# Patient Record
Sex: FEMALE | Race: WHITE
[De-identification: names, ages, dates, MRNs, and addresses within clinical notes are randomized per-mention and may not be internally consistent; named-entity substitution may affect disease eponyms.]

---

## 2019-04-20 ENCOUNTER — HOSPITAL ENCOUNTER (EMERGENCY)
Dept: HOSPITAL 96 - M.ERS | Age: 44
Discharge: HOME | End: 2019-04-20
Payer: COMMERCIAL

## 2019-04-20 VITALS — HEIGHT: 64 IN | BODY MASS INDEX: 19.99 KG/M2 | WEIGHT: 117.07 LBS

## 2019-04-20 VITALS — DIASTOLIC BLOOD PRESSURE: 55 MMHG | SYSTOLIC BLOOD PRESSURE: 113 MMHG

## 2019-04-20 DIAGNOSIS — Z88.0: ICD-10-CM

## 2019-04-20 DIAGNOSIS — G43.909: Primary | ICD-10-CM

## 2019-04-29 ENCOUNTER — HOSPITAL ENCOUNTER (EMERGENCY)
Dept: HOSPITAL 96 - M.ERS | Age: 44
Discharge: HOME | End: 2019-04-29
Payer: COMMERCIAL

## 2019-04-29 VITALS — DIASTOLIC BLOOD PRESSURE: 60 MMHG | SYSTOLIC BLOOD PRESSURE: 107 MMHG

## 2019-04-29 VITALS — HEIGHT: 64 IN | WEIGHT: 114.99 LBS | BODY MASS INDEX: 19.63 KG/M2

## 2019-04-29 DIAGNOSIS — Z88.0: ICD-10-CM

## 2019-04-29 DIAGNOSIS — R91.8: ICD-10-CM

## 2019-04-29 DIAGNOSIS — G43.909: ICD-10-CM

## 2019-04-29 DIAGNOSIS — J02.9: Primary | ICD-10-CM

## 2019-06-08 ENCOUNTER — HOSPITAL ENCOUNTER (OUTPATIENT)
Dept: HOSPITAL 96 - M.RAD | Age: 44
End: 2019-06-08
Attending: NURSE PRACTITIONER
Payer: COMMERCIAL

## 2019-06-08 DIAGNOSIS — J43.9: Primary | ICD-10-CM

## 2020-02-21 ENCOUNTER — HOSPITAL ENCOUNTER (EMERGENCY)
Dept: HOSPITAL 96 - M.ERS | Age: 45
Discharge: HOME | End: 2020-02-21
Payer: COMMERCIAL

## 2020-02-21 VITALS — SYSTOLIC BLOOD PRESSURE: 122 MMHG | DIASTOLIC BLOOD PRESSURE: 72 MMHG

## 2020-02-21 VITALS — HEIGHT: 64 IN | BODY MASS INDEX: 20.49 KG/M2 | WEIGHT: 120 LBS

## 2020-02-21 DIAGNOSIS — M54.6: Primary | ICD-10-CM

## 2020-02-21 DIAGNOSIS — Z88.0: ICD-10-CM

## 2020-02-21 DIAGNOSIS — G43.909: ICD-10-CM

## 2020-02-21 DIAGNOSIS — Z98.51: ICD-10-CM

## 2020-02-21 NOTE — EKG
[place] [address]
Phone:  [phone]                     ELECTROCARDIOGRAM REPORT      
_______________________________________________________________________________
 
Name:         ALVARADOJOSE R              Room:                     [facility]#:    [id number]     Account #:     [id number]  
Admission:    20    Attend Phys:                     
Discharge:    20    Date of Birth: [birth year]  
Date of Service: 20 1206  Report #:      [id number]
        [id number]
_______________________________________________________________________________
THIS REPORT FOR:  //name//                      
 
                         [facility] ED
                                       
Test Date:    2020               Test Time:    12:06:04
Pat Name:     JOSE ALVARADO           Department:   
Patient ID:   [id number]            Room:          
Gender:       F                        Technician:   YARA
:          1975               Requested By: Des Shukla
Order Number: [id number] MD:   Juan Pablo Overton
                                 Measurements
Intervals                              Axis          
Rate:         55                       P:            61
WV:           124                      QRS:          61
QRSD:         92                       T:            47
QT:           438                                    
QTc:          419                                    
                           Interpretive Statements
Sinus rhythm
Low voltage, precordial leads
Baseline wander in lead(s) V1
No previous ECG available for comparison
 
Electronically Signed On 2020 17:41:18 CST by Juan Pablo Overton
[email or web address]
 
 
 
 
 
 
 
 
 
 
 
 
 
 
 
 
 
 
 
  <ELECTRONICALLY SIGNED>
                                           By: Juan Pablo Overton MD, [facility]   
  20     1741
D: 20 1206   _____________________________________
T: 20 1206   Juan Pablo Overton MD, [facility]     /EPI